# Patient Record
Sex: MALE | Race: WHITE | NOT HISPANIC OR LATINO | Employment: FULL TIME | URBAN - METROPOLITAN AREA
[De-identification: names, ages, dates, MRNs, and addresses within clinical notes are randomized per-mention and may not be internally consistent; named-entity substitution may affect disease eponyms.]

---

## 2017-11-05 ENCOUNTER — OFFICE VISIT (OUTPATIENT)
Dept: URGENT CARE | Facility: CLINIC | Age: 32
End: 2017-11-05
Payer: COMMERCIAL

## 2017-11-05 VITALS
BODY MASS INDEX: 28.05 KG/M2 | HEART RATE: 65 BPM | OXYGEN SATURATION: 99 % | SYSTOLIC BLOOD PRESSURE: 123 MMHG | WEIGHT: 200.38 LBS | DIASTOLIC BLOOD PRESSURE: 77 MMHG | HEIGHT: 71 IN | TEMPERATURE: 99 F

## 2017-11-05 DIAGNOSIS — R07.9 CHEST PAIN, UNSPECIFIED TYPE: Primary | ICD-10-CM

## 2017-11-05 PROCEDURE — 99203 OFFICE O/P NEW LOW 30 MIN: CPT | Mod: S$GLB,,, | Performed by: FAMILY MEDICINE

## 2017-11-05 PROCEDURE — 93000 ELECTROCARDIOGRAM COMPLETE: CPT | Mod: S$GLB,,, | Performed by: FAMILY MEDICINE

## 2017-11-05 NOTE — PROGRESS NOTES
"Subjective:       Patient ID: Favio Mantilla is a 32 y.o. male.    Vitals:  height is 5' 11" (1.803 m) and weight is 90.9 kg (200 lb 6.4 oz). His oral temperature is 98.7 °F (37.1 °C). His blood pressure is 123/77 and his pulse is 65. His oxygen saturation is 99%.     Chief Complaint: Chest Pain (sternum area)    Symptoms started 1 week ago.  In July had an incident, stress test came back fine.        Chest Pain    This is a new problem. The current episode started in the past 7 days. The onset quality is sudden. The problem has been unchanged. The quality of the pain is described as sharp and stabbing. Pertinent negatives include no cough, dizziness, fever, malaise/fatigue, near-syncope, palpitations, shortness of breath or syncope. The pain is aggravated by exertion.     Review of Systems   Constitution: Negative for chills, fever and malaise/fatigue.   HENT: Negative for hoarse voice.    Cardiovascular: Positive for chest pain (shooting, shocking pain). Negative for leg swelling, near-syncope, palpitations and syncope.   Respiratory: Negative for cough, shortness of breath and wheezing.    Neurological: Negative for dizziness and light-headedness.   Psychiatric/Behavioral: The patient is nervous/anxious.        Objective:      Physical Exam   Constitutional: He appears well-developed and well-nourished. He is cooperative.  Non-toxic appearance. He does not appear ill. No distress.   HENT:   Head: Normocephalic and atraumatic.   Right Ear: Hearing, tympanic membrane, external ear and ear canal normal.   Left Ear: Hearing, tympanic membrane, external ear and ear canal normal.   Nose: Nose normal. No mucosal edema, rhinorrhea or nasal deformity. No epistaxis. Right sinus exhibits no maxillary sinus tenderness and no frontal sinus tenderness. Left sinus exhibits no maxillary sinus tenderness and no frontal sinus tenderness.   Mouth/Throat: Uvula is midline, oropharynx is clear and moist and mucous membranes are normal. " No trismus in the jaw. Normal dentition. No uvula swelling. No posterior oropharyngeal erythema.   Eyes: Conjunctivae and lids are normal. Right eye exhibits no discharge. Left eye exhibits no discharge. No scleral icterus.   Sclera clear bilat   Neck: Trachea normal, normal range of motion, full passive range of motion without pain and phonation normal. Neck supple.   Cardiovascular: Normal rate, regular rhythm, normal heart sounds, intact distal pulses and normal pulses.    Pulmonary/Chest: Effort normal and breath sounds normal. No respiratory distress.   Abdominal: Soft. Normal appearance and bowel sounds are normal. He exhibits no distension, no pulsatile midline mass and no mass. There is no tenderness.   Neurological: He is alert.   Skin: Skin is intact. He is not diaphoretic.   Psychiatric: He has a normal mood and affect. His speech is normal and behavior is normal. Cognition and memory are normal.   Nursing note and vitals reviewed.    EKG: Rate at 53 with NSTWC, LVH, nad early repolarization disturbance.    Assessment:       1. Chest pain, unspecified type        Plan:         Chest pain, unspecified type  -     IN OFFICE EKG 12-LEAD (to [facility])        -     Patient had complete cardiac work-up (including stress test and Echo within the last 6-8 months), which was normal.         -     He reports a history of abnormal EKG and believes changes are chronic         -     We discussed options including shifting to ER vs. observation with communication with his Cardiologist. He would like to observe. We will provide a copy of the EKG and his Cardiologist will discuss with him tomorrow. 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 Risks/benefits discussed and all questions answered to patient's satisfaction.